# Patient Record
Sex: MALE | Race: WHITE | NOT HISPANIC OR LATINO
[De-identification: names, ages, dates, MRNs, and addresses within clinical notes are randomized per-mention and may not be internally consistent; named-entity substitution may affect disease eponyms.]

---

## 2019-01-23 ENCOUNTER — TRANSCRIPTION ENCOUNTER (OUTPATIENT)
Age: 74
End: 2019-01-23

## 2020-08-11 ENCOUNTER — NON-APPOINTMENT (OUTPATIENT)
Age: 75
End: 2020-08-11

## 2020-08-11 ENCOUNTER — APPOINTMENT (OUTPATIENT)
Dept: OPHTHALMOLOGY | Facility: CLINIC | Age: 75
End: 2020-08-11
Payer: MEDICARE

## 2020-08-11 PROCEDURE — 92014 COMPRE OPH EXAM EST PT 1/>: CPT

## 2020-08-27 PROBLEM — Z00.00 ENCOUNTER FOR PREVENTIVE HEALTH EXAMINATION: Status: ACTIVE | Noted: 2020-08-27

## 2020-09-06 ENCOUNTER — APPOINTMENT (OUTPATIENT)
Dept: MRI IMAGING | Facility: CLINIC | Age: 75
End: 2020-09-06
Payer: MEDICARE

## 2020-09-06 ENCOUNTER — OUTPATIENT (OUTPATIENT)
Dept: OUTPATIENT SERVICES | Facility: HOSPITAL | Age: 75
LOS: 1 days | End: 2020-09-06
Payer: MEDICARE

## 2020-09-06 DIAGNOSIS — Z00.8 ENCOUNTER FOR OTHER GENERAL EXAMINATION: ICD-10-CM

## 2020-09-06 PROCEDURE — 73221 MRI JOINT UPR EXTREM W/O DYE: CPT | Mod: 26,RT

## 2020-09-06 PROCEDURE — 73221 MRI JOINT UPR EXTREM W/O DYE: CPT

## 2020-11-11 ENCOUNTER — APPOINTMENT (OUTPATIENT)
Dept: OPHTHALMOLOGY | Facility: CLINIC | Age: 75
End: 2020-11-11

## 2021-03-08 ENCOUNTER — INPATIENT (INPATIENT)
Facility: HOSPITAL | Age: 76
LOS: 1 days | Discharge: HOME | DRG: 247 | End: 2021-03-10
Attending: INTERNAL MEDICINE | Admitting: INTERNAL MEDICINE
Payer: MEDICARE

## 2021-03-08 VITALS
OXYGEN SATURATION: 95 % | HEIGHT: 60 IN | HEART RATE: 67 BPM | DIASTOLIC BLOOD PRESSURE: 84 MMHG | RESPIRATION RATE: 18 BRPM | SYSTOLIC BLOOD PRESSURE: 167 MMHG | WEIGHT: 160.06 LBS | TEMPERATURE: 98 F

## 2021-03-08 DIAGNOSIS — R07.9 CHEST PAIN, UNSPECIFIED: ICD-10-CM

## 2021-03-08 LAB
ALBUMIN SERPL ELPH-MCNC: 4.5 G/DL — SIGNIFICANT CHANGE UP (ref 3.3–5)
ALP SERPL-CCNC: 53 U/L — SIGNIFICANT CHANGE UP (ref 40–120)
ALT FLD-CCNC: 22 U/L — SIGNIFICANT CHANGE UP (ref 10–45)
ANION GAP SERPL CALC-SCNC: 12 MMOL/L — SIGNIFICANT CHANGE UP (ref 5–17)
APTT BLD: 34.8 SEC — SIGNIFICANT CHANGE UP (ref 27.5–35.5)
AST SERPL-CCNC: 38 U/L — SIGNIFICANT CHANGE UP (ref 10–40)
BASOPHILS # BLD AUTO: 0.09 K/UL — SIGNIFICANT CHANGE UP (ref 0–0.2)
BASOPHILS NFR BLD AUTO: 1 % — SIGNIFICANT CHANGE UP (ref 0–2)
BILIRUB SERPL-MCNC: 0.5 MG/DL — SIGNIFICANT CHANGE UP (ref 0.2–1.2)
BLD GP AB SCN SERPL QL: NEGATIVE — SIGNIFICANT CHANGE UP
BUN SERPL-MCNC: 19 MG/DL — SIGNIFICANT CHANGE UP (ref 7–23)
CALCIUM SERPL-MCNC: 10.7 MG/DL — HIGH (ref 8.4–10.5)
CHLORIDE SERPL-SCNC: 102 MMOL/L — SIGNIFICANT CHANGE UP (ref 96–108)
CK MB BLD-MCNC: 5.7 % — HIGH (ref 0–3.5)
CK MB CFR SERPL CALC: 11.1 NG/ML — HIGH (ref 0–6.7)
CK SERPL-CCNC: 196 U/L — SIGNIFICANT CHANGE UP (ref 30–200)
CO2 SERPL-SCNC: 26 MMOL/L — SIGNIFICANT CHANGE UP (ref 22–31)
CREAT SERPL-MCNC: 1.05 MG/DL — SIGNIFICANT CHANGE UP (ref 0.5–1.3)
EOSINOPHIL # BLD AUTO: 0.44 K/UL — SIGNIFICANT CHANGE UP (ref 0–0.5)
EOSINOPHIL NFR BLD AUTO: 4.9 % — SIGNIFICANT CHANGE UP (ref 0–6)
GLUCOSE SERPL-MCNC: 87 MG/DL — SIGNIFICANT CHANGE UP (ref 70–99)
HCT VFR BLD CALC: 48.9 % — SIGNIFICANT CHANGE UP (ref 39–50)
HGB BLD-MCNC: 16.5 G/DL — SIGNIFICANT CHANGE UP (ref 13–17)
IMM GRANULOCYTES NFR BLD AUTO: 0.9 % — SIGNIFICANT CHANGE UP (ref 0–1.5)
INR BLD: 0.95 RATIO — SIGNIFICANT CHANGE UP (ref 0.88–1.16)
LYMPHOCYTES # BLD AUTO: 2.58 K/UL — SIGNIFICANT CHANGE UP (ref 1–3.3)
LYMPHOCYTES # BLD AUTO: 28.4 % — SIGNIFICANT CHANGE UP (ref 13–44)
MCHC RBC-ENTMCNC: 31.2 PG — SIGNIFICANT CHANGE UP (ref 27–34)
MCHC RBC-ENTMCNC: 33.7 GM/DL — SIGNIFICANT CHANGE UP (ref 32–36)
MCV RBC AUTO: 92.4 FL — SIGNIFICANT CHANGE UP (ref 80–100)
MONOCYTES # BLD AUTO: 0.68 K/UL — SIGNIFICANT CHANGE UP (ref 0–0.9)
MONOCYTES NFR BLD AUTO: 7.5 % — SIGNIFICANT CHANGE UP (ref 2–14)
NEUTROPHILS # BLD AUTO: 5.2 K/UL — SIGNIFICANT CHANGE UP (ref 1.8–7.4)
NEUTROPHILS NFR BLD AUTO: 57.3 % — SIGNIFICANT CHANGE UP (ref 43–77)
NRBC # BLD: 0 /100 WBCS — SIGNIFICANT CHANGE UP (ref 0–0)
PLATELET # BLD AUTO: 170 K/UL — SIGNIFICANT CHANGE UP (ref 150–400)
POTASSIUM SERPL-MCNC: 5 MMOL/L — SIGNIFICANT CHANGE UP (ref 3.5–5.3)
POTASSIUM SERPL-SCNC: 5 MMOL/L — SIGNIFICANT CHANGE UP (ref 3.5–5.3)
PROT SERPL-MCNC: 7.5 G/DL — SIGNIFICANT CHANGE UP (ref 6–8.3)
PROTHROM AB SERPL-ACNC: 11.4 SEC — SIGNIFICANT CHANGE UP (ref 10.6–13.6)
RBC # BLD: 5.29 M/UL — SIGNIFICANT CHANGE UP (ref 4.2–5.8)
RBC # FLD: 13.5 % — SIGNIFICANT CHANGE UP (ref 10.3–14.5)
RH IG SCN BLD-IMP: POSITIVE — SIGNIFICANT CHANGE UP
SODIUM SERPL-SCNC: 140 MMOL/L — SIGNIFICANT CHANGE UP (ref 135–145)
TROPONIN T, HIGH SENSITIVITY RESULT: 75 NG/L — HIGH (ref 0–51)
TROPONIN T, HIGH SENSITIVITY RESULT: 75 NG/L — HIGH (ref 0–51)
WBC # BLD: 9.07 K/UL — SIGNIFICANT CHANGE UP (ref 3.8–10.5)
WBC # FLD AUTO: 9.07 K/UL — SIGNIFICANT CHANGE UP (ref 3.8–10.5)

## 2021-03-08 PROCEDURE — 71046 X-RAY EXAM CHEST 2 VIEWS: CPT | Mod: 26

## 2021-03-08 PROCEDURE — 93010 ELECTROCARDIOGRAM REPORT: CPT | Mod: GC

## 2021-03-08 PROCEDURE — 99285 EMERGENCY DEPT VISIT HI MDM: CPT | Mod: CS,GC

## 2021-03-08 RX ORDER — SIMVASTATIN 20 MG/1
40 TABLET, FILM COATED ORAL AT BEDTIME
Refills: 0 | Status: DISCONTINUED | OUTPATIENT
Start: 2021-03-08 | End: 2021-03-10

## 2021-03-08 RX ORDER — ASPIRIN/CALCIUM CARB/MAGNESIUM 324 MG
81 TABLET ORAL DAILY
Refills: 0 | Status: DISCONTINUED | OUTPATIENT
Start: 2021-03-08 | End: 2021-03-10

## 2021-03-08 RX ORDER — METOPROLOL TARTRATE 50 MG
1 TABLET ORAL
Qty: 0 | Refills: 0 | DISCHARGE

## 2021-03-08 RX ORDER — CLOPIDOGREL BISULFATE 75 MG/1
75 TABLET, FILM COATED ORAL DAILY
Refills: 0 | Status: DISCONTINUED | OUTPATIENT
Start: 2021-03-08 | End: 2021-03-10

## 2021-03-08 RX ORDER — METOPROLOL TARTRATE 50 MG
25 TABLET ORAL
Refills: 0 | Status: DISCONTINUED | OUTPATIENT
Start: 2021-03-08 | End: 2021-03-10

## 2021-03-08 RX ORDER — HEPARIN SODIUM 5000 [USP'U]/ML
800 INJECTION INTRAVENOUS; SUBCUTANEOUS
Qty: 25000 | Refills: 0 | Status: DISCONTINUED | OUTPATIENT
Start: 2021-03-08 | End: 2021-03-09

## 2021-03-08 RX ORDER — SIMVASTATIN 20 MG/1
1 TABLET, FILM COATED ORAL
Qty: 0 | Refills: 0 | DISCHARGE

## 2021-03-08 RX ORDER — AMLODIPINE BESYLATE 2.5 MG/1
5 TABLET ORAL DAILY
Refills: 0 | Status: DISCONTINUED | OUTPATIENT
Start: 2021-03-08 | End: 2021-03-10

## 2021-03-08 RX ORDER — INFLUENZA VIRUS VACCINE 15; 15; 15; 15 UG/.5ML; UG/.5ML; UG/.5ML; UG/.5ML
0.5 SUSPENSION INTRAMUSCULAR ONCE
Refills: 0 | Status: DISCONTINUED | OUTPATIENT
Start: 2021-03-08 | End: 2021-03-10

## 2021-03-08 RX ORDER — ASPIRIN/CALCIUM CARB/MAGNESIUM 324 MG
1 TABLET ORAL
Qty: 0 | Refills: 0 | DISCHARGE

## 2021-03-08 RX ORDER — EZETIMIBE 10 MG/1
1 TABLET ORAL
Qty: 0 | Refills: 0 | DISCHARGE

## 2021-03-08 RX ORDER — HEPARIN SODIUM 5000 [USP'U]/ML
4400 INJECTION INTRAVENOUS; SUBCUTANEOUS EVERY 6 HOURS
Refills: 0 | Status: DISCONTINUED | OUTPATIENT
Start: 2021-03-08 | End: 2021-03-09

## 2021-03-08 RX ORDER — BUDESONIDE AND FORMOTEROL FUMARATE DIHYDRATE 160; 4.5 UG/1; UG/1
2 AEROSOL RESPIRATORY (INHALATION)
Refills: 0 | Status: DISCONTINUED | OUTPATIENT
Start: 2021-03-08 | End: 2021-03-10

## 2021-03-08 RX ORDER — BUDESONIDE AND FORMOTEROL FUMARATE DIHYDRATE 160; 4.5 UG/1; UG/1
2 AEROSOL RESPIRATORY (INHALATION)
Qty: 0 | Refills: 0 | DISCHARGE

## 2021-03-08 RX ORDER — AMLODIPINE AND VALSARTAN 5; 320 MG/1; MG/1
1 TABLET, FILM COATED ORAL
Qty: 0 | Refills: 0 | DISCHARGE

## 2021-03-08 RX ORDER — HEPARIN SODIUM 5000 [USP'U]/ML
5000 INJECTION INTRAVENOUS; SUBCUTANEOUS EVERY 12 HOURS
Refills: 0 | Status: DISCONTINUED | OUTPATIENT
Start: 2021-03-08 | End: 2021-03-08

## 2021-03-08 RX ORDER — VALSARTAN 80 MG/1
160 TABLET ORAL DAILY
Refills: 0 | Status: DISCONTINUED | OUTPATIENT
Start: 2021-03-08 | End: 2021-03-10

## 2021-03-08 RX ADMIN — SIMVASTATIN 40 MILLIGRAM(S): 20 TABLET, FILM COATED ORAL at 21:56

## 2021-03-08 RX ADMIN — BUDESONIDE AND FORMOTEROL FUMARATE DIHYDRATE 2 PUFF(S): 160; 4.5 AEROSOL RESPIRATORY (INHALATION) at 21:55

## 2021-03-08 RX ADMIN — HEPARIN SODIUM 800 UNIT(S)/HR: 5000 INJECTION INTRAVENOUS; SUBCUTANEOUS at 23:10

## 2021-03-08 RX ADMIN — HEPARIN SODIUM 5000 UNIT(S): 5000 INJECTION INTRAVENOUS; SUBCUTANEOUS at 21:55

## 2021-03-08 NOTE — H&P ADULT - ASSESSMENT
76 yo male with history of hypertension and hypercholesterol presents after being referred by pmd for unstable angina. He has had chest pain with no radiation for the past 3 weeks. He has had some shortness of breath and palpitations with activity. No swelling and has been taking his medications. No fevers, cough, belly pain or swelling noted.  pt with sig cardiac risk factor with c/o increasing sob .pulmonary htn, with new onset of exertional sob and gamboa with ecg changes.  tele  asa  plavix  beta blocker  lipid, continue zocor  tsh  continue all bp meds  R/L heart cath

## 2021-03-08 NOTE — ED PROVIDER NOTE - OBJECTIVE STATEMENT
74 yo male with history of hypertension and hypercholesterol presents after being referred by Dr. Fuentes for unstable angina. He has had chest pain with no radiation for the past 3 weeks. He has had some shortness of breath and palpitations with activity. No swelling and has been taking his medications. No fevers, cough, belly pain or swelling noted. 74 yo male with history of hypertension and hypercholesterol presents after being referred by Dr. Fuentes for unstable angina. He has had chest pain with no radiation for the past 3 weeks. He has had some shortness of breath and palpitations with activity. No swelling and has been taking his medications. No fevers, cough, belly pain or swelling noted.    He has been taking aspirin, amlodipine, metoprolol, simvastatin, and symbicort. 76 yo male with history of hypertension and hypercholesterol presents after being referred by Dr. Fuentes for unstable angina. He has had chest pain with no radiation for the past 3 weeks. He has had some shortness of breath and palpitations with activity. No swelling and has been taking his medications. No fevers, cough, belly pain or swelling noted. Does reports Abnormal Stress test 8/2020, which he did not follow-up on  He has been taking aspirin, amlodipine, metoprolol, simvastatin, and symbicort.

## 2021-03-08 NOTE — ED PROVIDER NOTE - PRINCIPAL DIAGNOSIS
- FSBS with low dose lispro coverage  - HBA1c 6.6 can restart ASA post biopsy Chest pain, unspecified type

## 2021-03-08 NOTE — ED PROVIDER NOTE - ST/T WAVE
no acute ST elevations/depressions no acute ST elevations/depressions, Twave inversions in Lateral leads

## 2021-03-08 NOTE — ED ADULT NURSE REASSESSMENT NOTE - NS ED NURSE REASSESS COMMENT FT1
Patient resting in bed comfortably in no acute distress, pending further orders, updated on plan of care. Billy RN

## 2021-03-08 NOTE — H&P ADULT - HISTORY OF PRESENT ILLNESS
CHIEF COMPLAINT:Patient is a 75y old  Male who presents with a chief complaint of sob    HPI:  76 yo male with history of hypertension and hypercholesterol presents after being referred by pmd for unstable angina. He has had chest pain with no radiation for the past 3 weeks. He has had some shortness of breath and palpitations with activity. No swelling and has been taking his medications. No fevers, cough, belly pain or swelling noted.  pt with sig cradiac risk factor with c/o increasing sob .pulmonary htn, with new onst of exertional sob and gamboa with ecg changes.    PAST MEDICAL & SURGICAL HISTORY:  Hypercholesteremia    HTN (Hypertension)    History of Pneumonectomy        MEDICATIONS  (STANDING):  noted    MEDICATIONS  (PRN):      FAMILY HISTORY:      SOCIAL HISTORY:    [ ] Non-smoker  [ ] Smoker  [ ] Alcohol    Allergies    No Known Allergies    Intolerances    	    REVIEW OF SYSTEMS:  CONSTITUTIONAL: No fever, weight loss, or fatigue  EYES: No eye pain, visual disturbances, or discharge  ENT:  No difficulty hearing, tinnitus, vertigo; No sinus or throat pain  NECK: No pain or stiffness  RESPIRATORY: No cough, wheezing, chills or hemoptysis; + exertional  Shortness of Breath  CARDIOVASCULAR: No chest pain, palpitations, passing out, dizziness, or leg swelling  GASTROINTESTINAL: No abdominal or epigastric pain. No nausea, vomiting, or hematemesis; No diarrhea or constipation. No melena or hematochezia.  GENITOURINARY: No dysuria, frequency, hematuria, or incontinence  NEUROLOGICAL: No headaches, memory loss, loss of strength, numbness, or tremors  SKIN: No itching, burning, rashes, or lesions   LYMPH Nodes: No enlarged glands  ENDOCRINE: No heat or cold intolerance; No hair loss  MUSCULOSKELETAL: No joint pain or swelling; No muscle, back, or extremity pain  PSYCHIATRIC: No depression, anxiety, mood swings, or difficulty sleeping  HEME/LYMPH: No easy bruising, or bleeding gums  ALLERGY AND IMMUNOLOGIC: No hives or eczema	    [ ] All others negative	  [ ] Unable to obtain    PHYSICAL EXAM:  T(C): 36.7 (03-08-21 @ 15:43), Max: 36.7 (03-08-21 @ 15:43)  HR: 67 (03-08-21 @ 15:43) (67 - 67)  BP: 167/84 (03-08-21 @ 15:43) (167/84 - 167/84)  RR: 18 (03-08-21 @ 15:43) (18 - 18)  SpO2: 95% (03-08-21 @ 15:43) (95% - 95%)  Wt(kg): --  I&O's Summary      Appearance: Normal	  HEENT:   Normal oral mucosa, PERRL, EOMI	  Lymphatic: No lymphadenopathy  Cardiovascular: Normal S1 S2, No JVD, + murmurs, No edema  Respiratory: rhonchi  Psychiatry: A & O x 3, Mood & affect appropriate  Gastrointestinal:  Soft, Non-tender, + BS	  Skin: No rashes, No ecchymoses, No cyanosis	  Neurologic: Non-focal  Extremities: Normal range of motion, No clubbing, cyanosis or edema  Vascular: Peripheral pulses palpable 2+ bilaterally    TELEMETRY: 	    ECG:  	  RADIOLOGY:  OTHER: 	  	  LABS:	 	    CARDIAC MARKERS:                              16.5   9.07  )-----------( 170      ( 08 Mar 2021 17:05 )             48.9     03-08    140  |  102  |  19  ----------------------------<  87  5.0   |  26  |  1.05    Ca    10.7<H>      08 Mar 2021 17:05    TPro  7.5  /  Alb  4.5  /  TBili  0.5  /  DBili  x   /  AST  38  /  ALT  22  /  AlkPhos  53  03-08    proBNP:   Lipid Profile:   HgA1c:   TSH:   PT/INR - ( 08 Mar 2021 17:05 )   PT: 11.4 sec;   INR: 0.95 ratio         PTT - ( 08 Mar 2021 17:05 )  PTT:34.8 sec    PREVIOUS DIAGNOSTIC TESTING:      < from: 12 Lead ECG (03.11.11 @ 08:25) >  Diagnosis Line NORMAL SINUS RHYTHM  RIGHT BUNDLE BRANCH BLOCK  LEFT VENTRICULAR HYPERTROPHY WITH QRS WIDENING  ST ELEVATION CONSIDER LATERAL INJURY OR ACUTE INFARCT

## 2021-03-08 NOTE — PATIENT PROFILE ADULT - FALL HARM RISK
"Sindi Warner (66 y.o. Male)     Date of Birth Social Security Number Address Home Phone MRN    1954  [id number]  [place] [address]  [id number]    Cheondoism Marital Status          Judaism        Admission Date Admission Type Admitting Provider Attending Provider Department, Room/Bed    5/2/20 Emergency Yahir Ferris MD Pagni, Sebastian, MD [facility] CARDIOVASCULAR CARE UNIT, 2203/1    Discharge Date Discharge Disposition Discharge Destination                       Attending Provider:  Yahir Ferris MD    Allergies:  Neosporin [Bacitracin-polymyxin B]    Isolation:  None   Infection:  None   Code Status:  CPR    Ht:  188 cm (74.02\")   Wt:  96.9 kg (213 lb 10 oz)    Admission Cmt:  None   Principal Problem:  Coronary artery disease of native artery of native heart with stable angina pectoris (CMS/[facility]) [I25.118]                 Active Insurance as of 5/2/2020     Primary Coverage     Payor Plan Insurance Group Employer/Plan Group    MEDICARE MEDICARE A ONLY      Payor Plan Address Payor Plan Phone Number Payor Plan Fax Number Effective Dates    [address] [phone]  4/1/2017 - None Entered    [facility] [id number]       Subscriber Name Subscriber Birth Date Member ID       SINDI WARNER 1954 [id number]           Secondary Coverage     Payor Plan Insurance Group Employer/Plan Group    HUMANA HUMANA [id number]     Payor Plan Address Payor Plan Phone Number Payor Plan Fax Number Effective Dates    [address] [address] [phone]  1/2/2017 - None Entered    [facility] [id number]       Subscriber Name Subscriber Birth Date Member ID       KORTNEY WARNER 1954 [id number]                 Emergency Contacts      (Rel.) Home Phone Work Phone Mobile Phone    KORTNEY WARNER (Spouse) -- -- [phone]              " n/a

## 2021-03-08 NOTE — CHART NOTE - NSCHARTNOTEFT_GEN_A_CORE
CC:    HPI: Called by RN to evaluate patient for . Patient seen and examined at the bedside. Denies fevers/chills, weakness, diaphoresis, headaches, dizziness, syncope, paresthesias, chest pain, palpitations, dyspnea, abdominal pain, N/V/D.     Vital Signs Last 24 Hrs  T(C): 36.5 (08 Mar 2021 21:15), Max: 36.7 (08 Mar 2021 15:43)  T(F): 97.7 (08 Mar 2021 21:15), Max: 98 (08 Mar 2021 15:43)  HR: 55 (08 Mar 2021 21:15) (55 - 67)  BP: 106/66 (08 Mar 2021 21:15) (106/66 - 167/84)  BP(mean): --  RR: 18 (08 Mar 2021 21:15) (16 - 18)  SpO2: 97% (08 Mar 2021 21:15) (95% - 99%)    PHYSICAL EXAM:  General: NAD, A&Ox3  Respiratory: Lungs clear to auscultation bilaterally. No wheezes, rales, rhonchi. Normal respiratory effort.   Cardiovascular: S1, S2 present. Regular rate and rhythm. No murmurs, rubs, or gallops  Gastrointestinal: BS x4 normoactive. Soft, non-tender, non-distended.   Extremities: 2+ peripheral pulses. Warm to touch. No edema, cyanosis.    A/P  HPI:  CHIEF COMPLAINT:Patient is a 75y old  Male who presents with a chief complaint of sob    HPI:  76 yo male with history of hypertension and hypercholesterol presents after being referred by pmd for unstable angina. He has had chest pain with no radiation for the past 3 weeks. He has had some shortness of breath and palpitations with activity. No swelling and has been taking his medications. No fevers, cough, belly pain or swelling noted.  pt with sig cradiac risk factor with c/o increasing sob .pulmonary htn, with new onst of exertional sob and gamboa with ecg changes.    PAST MEDICAL & SURGICAL HISTORY:  Hypercholesteremia    HTN (Hypertension)    History of Pneumonectomy        MEDICATIONS  (STANDING):  noted    MEDICATIONS  (PRN):      FAMILY HISTORY:      SOCIAL HISTORY:    [ ] Non-smoker  [ ] Smoker  [ ] Alcohol    Allergies    No Known Allergies    Intolerances    	    REVIEW OF SYSTEMS:  CONSTITUTIONAL: No fever, weight loss, or fatigue  EYES: No eye pain, visual disturbances, or discharge  ENT:  No difficulty hearing, tinnitus, vertigo; No sinus or throat pain  NECK: No pain or stiffness  RESPIRATORY: No cough, wheezing, chills or hemoptysis; + exertional  Shortness of Breath  CARDIOVASCULAR: No chest pain, palpitations, passing out, dizziness, or leg swelling  GASTROINTESTINAL: No abdominal or epigastric pain. No nausea, vomiting, or hematemesis; No diarrhea or constipation. No melena or hematochezia.  GENITOURINARY: No dysuria, frequency, hematuria, or incontinence  NEUROLOGICAL: No headaches, memory loss, loss of strength, numbness, or tremors  SKIN: No itching, burning, rashes, or lesions   LYMPH Nodes: No enlarged glands  ENDOCRINE: No heat or cold intolerance; No hair loss  MUSCULOSKELETAL: No joint pain or swelling; No muscle, back, or extremity pain  PSYCHIATRIC: No depression, anxiety, mood swings, or difficulty sleeping  HEME/LYMPH: No easy bruising, or bleeding gums  ALLERGY AND IMMUNOLOGIC: No hives or eczema	    [ ] All others negative	  [ ] Unable to obtain    PHYSICAL EXAM:  T(C): 36.7 (03-08-21 @ 15:43), Max: 36.7 (03-08-21 @ 15:43)  HR: 67 (03-08-21 @ 15:43) (67 - 67)  BP: 167/84 (03-08-21 @ 15:43) (167/84 - 167/84)  RR: 18 (03-08-21 @ 15:43) (18 - 18)  SpO2: 95% (03-08-21 @ 15:43) (95% - 95%)  Wt(kg): --  I&O's Summary      Appearance: Normal	  HEENT:   Normal oral mucosa, PERRL, EOMI	  Lymphatic: No lymphadenopathy  Cardiovascular: Normal S1 S2, No JVD, + murmurs, No edema  Respiratory: rhonchi  Psychiatry: A & O x 3, Mood & affect appropriate  Gastrointestinal:  Soft, Non-tender, + BS	  Skin: No rashes, No ecchymoses, No cyanosis	  Neurologic: Non-focal  Extremities: Normal range of motion, No clubbing, cyanosis or edema  Vascular: Peripheral pulses palpable 2+ bilaterally    TELEMETRY: 	    ECG:  	  RADIOLOGY:  OTHER: 	  	  LABS:	 	    CARDIAC MARKERS:                              16.5   9.07  )-----------( 170      ( 08 Mar 2021 17:05 )             48.9     03-08    140  |  102  |  19  ----------------------------<  87  5.0   |  26  |  1.05    Ca    10.7<H>      08 Mar 2021 17:05    TPro  7.5  /  Alb  4.5  /  TBili  0.5  /  DBili  x   /  AST  38  /  ALT  22  /  AlkPhos  53  03-08    proBNP:   Lipid Profile:   HgA1c:   TSH:   PT/INR - ( 08 Mar 2021 17:05 )   PT: 11.4 sec;   INR: 0.95 ratio         PTT - ( 08 Mar 2021 17:05 )  PTT:34.8 sec    PREVIOUS DIAGNOSTIC TESTING:      < from: 12 Lead ECG (03.11.11 @ 08:25) >  Diagnosis Line NORMAL SINUS RHYTHM  RIGHT BUNDLE BRANCH BLOCK  LEFT VENTRICULAR HYPERTROPHY WITH QRS WIDENING  ST ELEVATION CONSIDER LATERAL INJURY OR ACUTE INFARCT           (08 Mar 2021 17:36)      #  -Repeat vitals were stable  -Discussed with RN  -Will continue to monitor  -Will endorse to AM team      Em Baker PA-C  # CC: Chest pain    HPI: Called by RN to evaluate patient for chest pain. Patient seen and examined at the bedside. Patient states that chest pain had subsided prior to being seen by provider, and denies any current chest pain, palpitations, or dyspnea. Patient reports chest pain is intermittent and non-radiating, and has been "ok" in rating. Denies fevers/chills, weakness, diaphoresis, headaches, dizziness, syncope, paresthesias, abdominal pain, N/V/D.     Vital Signs Last 24 Hrs  T(C): 36.5 (08 Mar 2021 21:15), Max: 36.7 (08 Mar 2021 15:43)  T(F): 97.7 (08 Mar 2021 21:15), Max: 98 (08 Mar 2021 15:43)  HR: 55 (08 Mar 2021 21:15) (55 - 67)  BP: 106/66 (08 Mar 2021 21:15) (106/66 - 167/84)  RR: 18 (08 Mar 2021 21:15) (16 - 18)  SpO2: 97% (08 Mar 2021 21:15) (95% - 99%)    PHYSICAL EXAM:  General: NAD, A&Ox3  Respiratory: Lungs clear to auscultation bilaterally. No wheezes, rales, rhonchi. Normal respiratory effort.   Cardiovascular: S1, S2 present. Regular rate and rhythm. No murmurs, rubs, or gallops  Gastrointestinal: BS x4 normoactive. Soft, non-tender, non-distended.   Extremities: 2+ peripheral pulses. Warm to touch. No edema, cyanosis.    LABS:  Troponin T, High Sensitivity (03.08.21 @ 17:05)    Troponin T, High Sensitivity Result: 75: Moderate Hemolysis,  Troponin T results may be falsely decreased  *  *  Rapid upward or downward changes in high-sensitivity troponin levels  suggest acute myocardial injury. Renal impairment may cause sustained  troponin elevations.  Normal: <6 - 14ng/L  Indeterminate: 15-51 ng/L  Elevated: > 51 ng/L  See http://labs/test/[email or web address] on the [facility] intranet for more  information ng/L    Troponin T, High Sensitivity (03.08.21 @ 21:19)    Troponin T, High Sensitivity Result: 75: Moderate Hemolysis,Troponin T results may be falsely decreased  *  *  Rapid upward or downward changes in high-sensitivity troponin levels  suggest acute myocardial injury. Renal impairment may cause sustained  troponin elevations.  Normal: <6 - 14 ng/L  Indeterminate: 15-51 ng/L  Elevated: > 51 ng/L  See [email or web address]/test/[email or web address] on the [facility] intranet for more  information ng/L    CARDIAC MARKERS ( 08 Mar 2021 21:19 )  x     / x     / 196 U/L / x     / 11.1 ng/mL    RADIOLOGY:  < from: Xray Chest 2 Views PA/Lat (03.08.21 @ 17:57) >    No focal consolidation. Small left pleural effusion cannot be ruled out entirely.    < end of copied text >      A/P  76 yo male with history of hypertension and hypercholesterol presents after being referred by Dr. Fuentes for unstable angina. He has had chest pain with no radiation for the past 3 weeks, and some shortness of breath and palpitations with activity.   Now, patient with chest pain that subsided prior to being seen, and denies any current chest pain, palpitations, or dyspnea. Patient reports chest pain is intermittent and non-radiating, and has been "ok" in rating.    #Chest pain, likely ACS  -Repeat vitals were stable  -Elevated trops have plateaued (75 -> 75)  -STAT EKG reveals sinus reshma (HR: 55) with new TWI in II-III and V3-V6  -R/L cath in AM  -Discussed with Dr. Fuentes: Start hep gtt @ 8cc/hr (800 units/hr)  -Discussed with RN  -Will continue to monitor  -Will endorse to AM team      Em Baker PA-C  #[id number]

## 2021-03-08 NOTE — ED PROVIDER NOTE - CLINICAL SUMMARY MEDICAL DECISION MAKING FREE TEXT BOX
Hermila Montemayor MD - Attending Physician: Pt here with chest pain, persistent x months. Sent in by Dr Fuentes. No red flags on arrival to ED. HDS. Labs, Xr, D/w Dr Fuentes for admission - likely needs Cath if persistent symptoms

## 2021-03-08 NOTE — ED ADULT NURSE NOTE - OBJECTIVE STATEMENT
76yo male PMHX HTN, HLD, presents ambulatory into er alert and oriented x 4 with complaints of chest pain x approximately 3 weeks, states sent by cardiology for angiogram, states hx angina in past with normal angiocardiogram, states pain is tolerable but presents due to recommendation by cardiology. Respirations even and nonlabored, breathing spontaneously on room air. Placed on cardiac monitor. Labs collected. Pending md evaluation. Will continue to monitor. VILMA Cervantes 76yo male PMHX HTN, HLD, presents ambulatory into er alert and oriented x 4 with complaints of left chest pain x approximately 3 weeks, states sent by cardiology for angiogram, states hx angina in past with normal angiocardiogram, states pain is tolerable but presents due to recommendation by cardiology. Respirations even and nonlabored, breathing spontaneously on room air. Denies dizziness, shortness of breath, blurry vision. Placed on cardiac monitor. Labs collected. Pending md evaluation. Will continue to monitor. VILMA Cervantes

## 2021-03-08 NOTE — ED PROVIDER NOTE - PROGRESS NOTE DETAILS
Discussed case with Dr. Jef Fuentes (#[phone]) who would like the patient admitted to him. Mary Newton MD

## 2021-03-08 NOTE — ED CLERICAL - NS ED CLERK UNITS
APER Advance Directive  Advance directives are legal documents that let you make choices ahead of time about your health care and medical treatment in case you become unable to communicate for yourself. Advance directives are a way for you to communicate your wishes to family, friends, and health care providers. This can help convey your decisions about end-of-life care if you become unable to communicate.  Discussing and writing advance directives should happen over time rather than all at once. Advance directives can be changed depending on your situation and what you want, even after you have signed the advance directives.  If you do not have an advance directive, some states assign family decision makers to act on your behalf based on how closely you are related to them. Each state has its own laws regarding advance directives. You may want to check with your health care provider, , or state representative about the laws in your state. There are different types of advance directives, such as:  · Medical power of .  · Living will.  · Do not resuscitate (DNR) or do not attempt resuscitation (DNAR) order.    Health care proxy and medical power of   A health care proxy, also called a health care agent, is a person who is appointed to make medical decisions for you in cases in which you are unable to make the decisions yourself. Generally, people choose someone they know well and trust to represent their preferences. Make sure to ask this person for an agreement to act as your proxy. A proxy may have to exercise judgment in the event of a medical decision for which your wishes are not known.  A medical power of  is a legal document that names your health care proxy. Depending on the laws in your state, after the document is written, it may also need to be:  · Signed.  · Notarized.  · Dated.  · Copied.  · Witnessed.  · Incorporated into your medical record.    You may also want to appoint  someone to manage your financial affairs in a situation in which you are unable to do so. This is called a durable power of  for finances. It is a separate legal document from the durable power of  for health care. You may choose the same person or someone different from your health care proxy to act as your agent in financial matters.  If you do not appoint a proxy, or if there is a concern that the proxy is not acting in your best interests, a court-appointed guardian may be designated to act on your behalf.  Living will  A living will is a set of instructions documenting your wishes about medical care when you cannot express them yourself. Health care providers should keep a copy of your living will in your medical record. You may want to give a copy to family members or friends. To alert caregivers in case of an emergency, you can place a card in your wallet to let them know that you have a living will and where they can find it. A living will is used if you become:  · Terminally ill.  · Incapacitated.  · Unable to communicate or make decisions.    Items to consider in your living will include:  · The use or non-use of life-sustaining equipment, such as dialysis machines and breathing machines (ventilators).  · A DNR or DNAR order, which is the instruction not to use cardiopulmonary resuscitation (CPR) if breathing or heartbeat stops.  · The use or non-use of tube feeding.  · Withholding of food and fluids.  · Comfort (palliative) care when the goal becomes comfort rather than a cure.  · Organ and tissue donation.    A living will does not give instructions for distributing your money and property if you should pass away. It is recommended that you seek the advice of a  when writing a will. Decisions about taxes, beneficiaries, and asset distribution will be legally binding. This process can relieve your family and friends of any concerns surrounding disputes or questions that may come up  about the distribution of your assets.  DNR or DNAR  A DNR or DNAR order is a request not to have CPR in the event that your heart stops beating or you stop breathing. If a DNR or DNAR order has not been made and shared, a health care provider will try to help any patient whose heart has stopped or who has stopped breathing. If you plan to have surgery, talk with your health care provider about how your DNR or DNAR order will be followed if problems occur.  Summary  · Advance directives are the legal documents that allow you to make choices ahead of time about your health care and medical treatment in case you become unable to communicate for yourself.  · The process of discussing and writing advance directives should happen over time. You can change the advance directives, even after you have signed them.  · Advance directives include DNR or DNAR orders, living dawn, and designating an agent as your medical power of .  This information is not intended to replace advice given to you by your health care provider. Make sure you discuss any questions you have with your health care provider.  Document Released: 2009 Document Revised: 2017 Document Reviewed: 2017  [employer] Interactive Patient Education © 2019 [employer]    Medicare Wellness  Personal Prevention Plan of Service     Date of Office Visit:  2019  Encounter Provider:  Sintia Pearson PA-C  Place of Service:  [facility]  Patient Name: Andrea Rosales Jr.  :  1946    As part of the Medicare Wellness portion of your visit today, we are providing you with this personalized preventive plan of services (PPPS). This plan is based upon recommendations of the [place] Preventive Services Task Force (USPSTF) and the Advisory Committee on Immunization Practices (ACIP).    This lists the preventive care services that should be considered, and provides dates of when you are due.  Items listed as completed are up-to-date and do not require any further intervention.    Health Maintenance   Topic Date Due   • TDAP/TD VACCINES (1 - Tdap) 10/28/1963   • ZOSTER VACCINE (1 of 2) 10/28/1994   • PNEUMOCOCCAL VACCINES (65+ LOW/MEDIUM RISK) (1 of 2 - PCV13) 10/28/2009   • MEDICARE ANNUAL WELLNESS  06/25/2019   • INFLUENZA VACCINE  08/01/2019   • COLONOSCOPY  08/25/2019   • LIPID PANEL  01/10/2020       No orders of the defined types were placed in this encounter.      No Follow-up on file.

## 2021-03-08 NOTE — ED ADULT NURSE NOTE - NSIMPLEMENTINTERV_GEN_ALL_ED
Implemented All Universal Safety Interventions:  [place] to call system. Call bell, personal items and telephone within reach. Instruct patient to call for assistance. Room bathroom lighting operational. Non-slip footwear when patient is off stretcher. Physically safe environment: no spills, clutter or unnecessary equipment. Stretcher in lowest position, wheels locked, appropriate side rails in place.

## 2021-03-09 ENCOUNTER — TRANSCRIPTION ENCOUNTER (OUTPATIENT)
Age: 76
End: 2021-03-09

## 2021-03-09 LAB
APTT BLD: 88.9 SEC — HIGH (ref 27.5–35.5)
CHOLEST SERPL-MCNC: 160 MG/DL — SIGNIFICANT CHANGE UP
HCT VFR BLD CALC: 45.7 % — SIGNIFICANT CHANGE UP (ref 39–50)
HDLC SERPL-MCNC: 48 MG/DL — SIGNIFICANT CHANGE UP
HGB BLD-MCNC: 15.2 G/DL — SIGNIFICANT CHANGE UP (ref 13–17)
LIPID PNL WITH DIRECT LDL SERPL: 100 MG/DL — HIGH
MCHC RBC-ENTMCNC: 30.7 PG — SIGNIFICANT CHANGE UP (ref 27–34)
MCHC RBC-ENTMCNC: 33.3 GM/DL — SIGNIFICANT CHANGE UP (ref 32–36)
MCV RBC AUTO: 92.3 FL — SIGNIFICANT CHANGE UP (ref 80–100)
NON HDL CHOLESTEROL: 112 MG/DL — SIGNIFICANT CHANGE UP
NRBC # BLD: 0 /100 WBCS — SIGNIFICANT CHANGE UP (ref 0–0)
PLATELET # BLD AUTO: 164 K/UL — SIGNIFICANT CHANGE UP (ref 150–400)
RBC # BLD: 4.95 M/UL — SIGNIFICANT CHANGE UP (ref 4.2–5.8)
RBC # FLD: 13.6 % — SIGNIFICANT CHANGE UP (ref 10.3–14.5)
SARS-COV-2 IGG SERPL QL IA: NEGATIVE — SIGNIFICANT CHANGE UP
SARS-COV-2 IGM SERPL IA-ACNC: 0.07 INDEX — SIGNIFICANT CHANGE UP
SARS-COV-2 RNA SPEC QL NAA+PROBE: SIGNIFICANT CHANGE UP
TRIGL SERPL-MCNC: 59 MG/DL — SIGNIFICANT CHANGE UP
WBC # BLD: 8.04 K/UL — SIGNIFICANT CHANGE UP (ref 3.8–10.5)
WBC # FLD AUTO: 8.04 K/UL — SIGNIFICANT CHANGE UP (ref 3.8–10.5)

## 2021-03-09 PROCEDURE — 92928 PRQ TCAT PLMT NTRAC ST 1 LES: CPT | Mod: LD

## 2021-03-09 PROCEDURE — 99152 MOD SED SAME PHYS/QHP 5/>YRS: CPT

## 2021-03-09 PROCEDURE — 93458 L HRT ARTERY/VENTRICLE ANGIO: CPT | Mod: 26,59

## 2021-03-09 PROCEDURE — 93010 ELECTROCARDIOGRAM REPORT: CPT

## 2021-03-09 PROCEDURE — 92921: CPT | Mod: LD

## 2021-03-09 RX ORDER — CLOPIDOGREL BISULFATE 75 MG/1
1 TABLET, FILM COATED ORAL
Qty: 90 | Refills: 3
Start: 2021-03-09 | End: 2022-03-03

## 2021-03-09 RX ADMIN — SIMVASTATIN 40 MILLIGRAM(S): 20 TABLET, FILM COATED ORAL at 21:37

## 2021-03-09 RX ADMIN — Medication 25 MILLIGRAM(S): at 05:21

## 2021-03-09 RX ADMIN — VALSARTAN 160 MILLIGRAM(S): 80 TABLET ORAL at 05:38

## 2021-03-09 RX ADMIN — HEPARIN SODIUM 650 UNIT(S)/HR: 5000 INJECTION INTRAVENOUS; SUBCUTANEOUS at 06:56

## 2021-03-09 RX ADMIN — BUDESONIDE AND FORMOTEROL FUMARATE DIHYDRATE 2 PUFF(S): 160; 4.5 AEROSOL RESPIRATORY (INHALATION) at 05:37

## 2021-03-09 RX ADMIN — CLOPIDOGREL BISULFATE 75 MILLIGRAM(S): 75 TABLET, FILM COATED ORAL at 05:22

## 2021-03-09 RX ADMIN — AMLODIPINE BESYLATE 5 MILLIGRAM(S): 2.5 TABLET ORAL at 05:21

## 2021-03-09 RX ADMIN — HEPARIN SODIUM 0 UNIT(S)/HR: 5000 INJECTION INTRAVENOUS; SUBCUTANEOUS at 06:25

## 2021-03-09 RX ADMIN — Medication 81 MILLIGRAM(S): at 05:22

## 2021-03-09 NOTE — DISCHARGE NOTE PROVIDER - NSDCFUADDINST_GEN_ALL_CORE_FT
Wound Care:   the day AFTER your procedure remove bandage GENTLY, and clean using  mild soap and gentle warm, water stream, pat dry. leave OPEN to air. YOU MAY SHOWER   DO NOT apply lotions, creams, ointments, powder, perfumes to your incision site  DO NOT SOAK your site for 1 week ( no baths, no pools, no tubs, etc...)  Check  your wrist daily. A small amount of bruising, and soreness are normal    ACTIVITY: for 24 hours   - DO NOT DRIVE  - DO NOT make any important decisions or sign legal documents   - DO NOT operate heavy machineries  - you may resume sexual activity in 48 hours, unless otherwise instructed by your cardiologist     If your procedure was done through the WRIST: for the NEXT 3DAYS:  - avoid pushing, pulling, with that affected wrist   - avoid repeated movement of that hand and wrist (eg: typing, hammering)  - DO NOT LIFT anything more than 5 lbs     If your procedure was done through the GROIN: for the NEXT 5 DAYS  - Limit climbing stairs, DO NOT soak in bathtub or pool  - no strenuous activities, pushing, pulling, straining  - Do not lift anything 10lbs or heavier     MEDICATION:   take your medications as explained (see discharge paperwork)   If you received a STENT, you will be taking antiplatelet medications to KEEP YOUR STENT OPEN ( eg: Aspirin, Plavix, Brilinta, Effient, etc).  Take as prescribed DO NOT STOP taking them without consulting with your cardiologist first.     Follow heart healthy diet recommended by your doctor, , if you smoke STOP SMOKING ( may call [phone] for center of tobacco control if you need assistance)     CALL your doctor to make appointment in 2 WEEKS     ***CALL YOUR DOCTOR***  if you experience: fever, chills, body aches, or severe pain, swelling, redness, heat or yellow discharge at incision site  If you experience Bleeding or excruciating pain at the procedural site, swelling ( golf ball size) at your procedural site  If you experience CHEST PAIN  If you experience extremity numbness, tingling, temperature change ( of your procedural site)   If you are unable to reach your doctor, you may contact:   -Cardiology Office at [facility] at [phone] or   - [facility] [phone]  - [facility] [phone]

## 2021-03-09 NOTE — DISCHARGE NOTE PROVIDER - NSDCMRMEDTOKEN_GEN_ALL_CORE_FT
amlodipine-valsartan 5 mg-160 mg oral tablet: 1 tab(s) orally once a day  aspirin 81 mg oral tablet, chewable: 1 tab(s) orally once a day  budesonide-formoterol 160 mcg-4.5 mcg/inh inhalation aerosol: 2 puff(s) inhaled 2 times a day  ezetimibe 10 mg oral tablet: 1 tab(s) orally once a day  metoprolol tartrate 25 mg oral tablet: 1 tab(s) orally 2 times a day  simvastatin 20 mg oral tablet: 1 tab(s) orally once a day (at bedtime)   amlodipine-valsartan 5 mg-160 mg oral tablet: 1 tab(s) orally once a day  aspirin 81 mg oral tablet, chewable: 1 tab(s) orally once a day  budesonide-formoterol 160 mcg-4.5 mcg/inh inhalation aerosol: 2 puff(s) inhaled 2 times a day  clopidogrel 75 mg oral tablet: 1 tab(s) orally once a day  ezetimibe 10 mg oral tablet: 1 tab(s) orally once a day  metoprolol tartrate 25 mg oral tablet: 1 tab(s) orally 2 times a day  simvastatin 20 mg oral tablet: 1 tab(s) orally once a day (at bedtime)

## 2021-03-09 NOTE — DISCHARGE NOTE PROVIDER - CARE PROVIDER_API CALL
Mela Fuentes  CARDIOVASCULAR DISEASE  [address], [address]  [place] [address]  Phone: [phone]  Fax: [phone]  Established Patient  Follow Up Time: 2 weeks

## 2021-03-09 NOTE — DISCHARGE NOTE PROVIDER - NSDCCPCAREPLAN_GEN_ALL_CORE_FT
PRINCIPAL DISCHARGE DIAGNOSIS  Diagnosis: Chest pain, unspecified type  Assessment and Plan of Treatment:       SECONDARY DISCHARGE DIAGNOSES  Diagnosis: CAD (coronary artery disease)  Assessment and Plan of Treatment: Low salt, low fat diet.   Weight management.   Take medications as prescribed.   No smoking.  Follow up appointments with your doctor(s)  as instruced.  Continue your medications. Do not stop Aspirin or Plavix unless instructed by your cardiologist.  No heavy lifting or pushing/pulling or strenuous activity with procedure arm for 2 weeks. No driving for 2 days. No sex for 1 week.  You may shower 24 hours following procedure but no soaking of your wrist in water (such as in a pool, sink, or tub) for 1 week. Check wrist site for bleeding and/or swelling daily following procedure. Call your doctor/cardiologist immediately for bleeding or swelling or if you have increased/persistent pain or drainage at the wrist site or if you have numbness, tingling or blue or white coloring of your hand or fingers.  Follow up with your cardiologist in 1- 2 weeks. You may call [facility] at [phone] or [phone] after office hours and weekends with any questions or concerns following your procedure.

## 2021-03-09 NOTE — DISCHARGE NOTE PROVIDER - NSDCCPTREATMENT_GEN_ALL_CORE_FT
PRINCIPAL PROCEDURE  Procedure: Left heart cardiac cath  Findings and Treatment: INDICATIONS: Unstable angina - CCS4.  HISTORY: There was no prior cardiac history. The patient has hypertension.  PROCEDURE:  --  Left heart catheterization.  --  Left coronary angiography.  --  Right coronary angiography.  --  Intervention on mid LAD: drug-eluting stent.  --  Intervention on D1: balloon angioplasty.  CORONARY VESSELS: The coronary circulation is right dominant.  LM:   --  LM: Normal.  LAD:   --  Mid LAD: There was a 99 % stenosis.  --  D1: There was a 99 % stenosis.  CX:   --  Circumflex: Normal.  RCA:   --  Mid RCA: There was a 40 % stenosis.  COMPLICATIONS: There were no complications.  INTERVENTIONAL RECOMMENDATIONS: DAPT for 6 mths

## 2021-03-09 NOTE — DISCHARGE NOTE PROVIDER - NSRESEARCHGRANT_MLMHIDDEN_GEN_A_CORE
Telephone Encounter by Mihaela Monreal RN at 10/02/17 12:09 PM     Author:  Mihaela Monreal RN Service:  (none) Author Type:  Registered Nurse     Filed:  10/02/17 12:12 PM Encounter Date:  10/2/2017 Status:  Signed     :  Mihaela Monreal RN (Registered Nurse)            Patient notified[HB1.1T] of labs from [facility] via [facility].[HB1.1M]       Revision History        User Key Date/Time User Provider Type Action    > HB1.1 10/02/17 12:12 PM Mihaela Monreal RN Registered Nurse Sign    M - Manual, T - Template            
yes

## 2021-03-09 NOTE — DISCHARGE NOTE PROVIDER - HOSPITAL COURSE
74 yo male with history of hypertension and hypercholesterol presents after being referred by pmd for unstable angina. He has had chest pain with no radiation for the past 3 weeks. He has had some shortness of breath and palpitations with activity.  pt with sig cradiac risk factor with c/o increasing sob .pulmonary htn, with new onst of exertional sob and gamboa with ecg changes.  3/8- s/p mLAD JOSHUA x1, POBA to D1 via right radial artery, no hematoma noted, positive pulses. Patient without complaint, hemodynamically stable. Pending discharge   74 yo male with history of hypertension and hypercholesterol presents after being referred by pmd for unstable angina. He has had chest pain with no radiation for the past 3 weeks. He has had some shortness of breath and palpitations with activity.  pt with sig cradiac risk factor with c/o increasing sob .pulmonary htn, with new onst of exertional sob and gamboa with ecg changes.  3/9- s/p mLAD JOSHUA x1, POBA to D1 via RRA access.  Patient tolerated the procedure well.  Post PCI EKG without acute changes.  RRA site benign, pulses palpable, no hematoma noted, patient without complaints.    3/10 No acute overnight events, patient remains hemodynamically stable and his hospital course was otherwise uneventful.  Patient is now medically stable for discharge home today.

## 2021-03-09 NOTE — DISCHARGE NOTE PROVIDER - NSDCFUSCHEDAPPT_GEN_ALL_CORE_FT
KELI ALLEN ; 04/06/2021 ; NPP Ophthal [address] [facility]  KELI ALLEN ; 04/06/2021 ; [facility]

## 2021-03-10 ENCOUNTER — TRANSCRIPTION ENCOUNTER (OUTPATIENT)
Age: 76
End: 2021-03-10

## 2021-03-10 VITALS
TEMPERATURE: 98 F | HEART RATE: 67 BPM | SYSTOLIC BLOOD PRESSURE: 98 MMHG | RESPIRATION RATE: 16 BRPM | DIASTOLIC BLOOD PRESSURE: 64 MMHG | OXYGEN SATURATION: 97 %

## 2021-03-10 RX ADMIN — AMLODIPINE BESYLATE 5 MILLIGRAM(S): 2.5 TABLET ORAL at 05:35

## 2021-03-10 RX ADMIN — BUDESONIDE AND FORMOTEROL FUMARATE DIHYDRATE 2 PUFF(S): 160; 4.5 AEROSOL RESPIRATORY (INHALATION) at 05:35

## 2021-03-10 RX ADMIN — Medication 81 MILLIGRAM(S): at 05:35

## 2021-03-10 RX ADMIN — CLOPIDOGREL BISULFATE 75 MILLIGRAM(S): 75 TABLET, FILM COATED ORAL at 05:35

## 2021-03-10 RX ADMIN — VALSARTAN 160 MILLIGRAM(S): 80 TABLET ORAL at 05:35

## 2021-03-10 NOTE — DISCHARGE NOTE NURSING/CASE MANAGEMENT/SOCIAL WORK - PATIENT PORTAL LINK FT
You can access the FollowMyHealth Patient Portal offered by [facility] by registering at the following website: [email or web address]. By joining [facility]’s FollowMyHealth portal, you will also be able to view your health information using other applications (apps) compatible with our system.

## 2021-03-10 NOTE — PROGRESS NOTE ADULT - SUBJECTIVE AND OBJECTIVE BOX
CARDIOLOGY     PROGRESS  NOTE   ________________________________________________    CHIEF COMPLAINT:Patient is a 75y old  Male who presents with a chief complaint of chest mi/sob (08 Mar 2021 17:36)  chest pain last night.  	  REVIEW OF SYSTEMS:  CONSTITUTIONAL: No fever, weight loss, or fatigue  EYES: No eye pain, visual disturbances, or discharge  ENT:  No difficulty hearing, tinnitus, vertigo; No sinus or throat pain  NECK: No pain or stiffness  RESPIRATORY: No cough, wheezing, chills or hemoptysis; No Shortness of Breath  CARDIOVASCULAR: No chest pain, palpitations, passing out, dizziness, or leg swelling  GASTROINTESTINAL: No abdominal or epigastric pain. No nausea, vomiting, or hematemesis; No diarrhea or constipation. No melena or hematochezia.  GENITOURINARY: No dysuria, frequency, hematuria, or incontinence  NEUROLOGICAL: No headaches, memory loss, loss of strength, numbness, or tremors  SKIN: No itching, burning, rashes, or lesions   LYMPH Nodes: No enlarged glands  ENDOCRINE: No heat or cold intolerance; No hair loss  MUSCULOSKELETAL: No joint pain or swelling; No muscle, back, or extremity pain  PSYCHIATRIC: No depression, anxiety, mood swings, or difficulty sleeping  HEME/LYMPH: No easy bruising, or bleeding gums  ALLERGY AND IMMUNOLOGIC: No hives or eczema	    [ ] All others negative	  [ ] Unable to obtain    PHYSICAL EXAM:  T(C): 36.6 (03-09-21 @ 05:40), Max: 36.7 (03-08-21 @ 15:43)  HR: 86 (03-09-21 @ 05:40) (55 - 86)  BP: 119/67 (03-09-21 @ 05:40) (96/56 - 167/84)  RR: 16 (03-09-21 @ 05:40) (16 - 18)  SpO2: 99% (03-09-21 @ 05:40) (95% - 100%)  Wt(kg): --  I&O's Summary    09 Mar 2021 07:01  -  09 Mar 2021 09:09  --------------------------------------------------------  IN: 240 mL / OUT: 0 mL / NET: 240 mL        Appearance: Normal	  HEENT:   Normal oral mucosa, PERRL, EOMI	  Lymphatic: No lymphadenopathy  Cardiovascular: Normal S1 S2, No JVD, + murmurs, No edema  Respiratory: Lungs clear to auscultation	  Psychiatry: A & O x 3, Mood & affect appropriate  Gastrointestinal:  Soft, Non-tender, + BS	  Skin: No rashes, No ecchymoses, No cyanosis	  Neurologic: Non-focal  Extremities: Normal range of motion, No clubbing, cyanosis or edema  Vascular: Peripheral pulses palpable 2+ bilaterally    MEDICATIONS  (STANDING):  amLODIPine   Tablet 5 milliGRAM(s) Oral daily  aspirin enteric coated 81 milliGRAM(s) Oral daily  budesonide 160 MICROgram(s)/formoterol 4.5 MICROgram(s) Inhaler 2 Puff(s) Inhalation two times a day  clopidogrel Tablet 75 milliGRAM(s) Oral daily  heparin  Infusion. 800 Unit(s)/Hr (8 mL/Hr) IV Continuous <Continuous>  influenza   Vaccine 0.5 milliLiter(s) IntraMuscular once  metoprolol tartrate 25 milliGRAM(s) Oral two times a day  simvastatin 40 milliGRAM(s) Oral at bedtime  valsartan 160 milliGRAM(s) Oral daily      TELEMETRY: 	    ECG:  	  RADIOLOGY:  OTHER: 	  	  LABS:	 	    CARDIAC MARKERS:  CARDIAC MARKERS ( 08 Mar 2021 21:19 )  x     / x     / 196 U/L / x     / 11.1 ng/mL                                15.2   8.04  )-----------( 164      ( 09 Mar 2021 05:52 )             45.7     03-08    140  |  102  |  19  ----------------------------<  87  5.0   |  26  |  1.05    Ca    10.7<H>      08 Mar 2021 17:05    TPro  7.5  /  Alb  4.5  /  TBili  0.5  /  DBili  x   /  AST  38  /  ALT  22  /  AlkPhos  53  03-08    proBNP:   Lipid Profile:   HgA1c:   TSH:   PT/INR - ( 08 Mar 2021 17:05 )   PT: 11.4 sec;   INR: 0.95 ratio         PTT - ( 09 Mar 2021 05:52 )  PTT:88.9 sec  < from: Cardiac Cath Lab (03.11.11 @ 10:29) >  LM:      LM: Normal.  LAD:      Mid LAD: There was a 50 % stenosis just after the bifurcation of  a large D1.  CX:      Circumflex: Normal.  RCA:      RCA: Angiography showed minor luminal irregularities with no flow  limiting lesions.    < end of copied text >      Assessment and plan  ---------------------------  76 yo male with history of hypertension and hypercholesterol presents after being referred by pmd for unstable angina. He has had chest pain with no radiation for the past 3 weeks. He has had some shortness of breath and palpitations with activity. No swelling and has been taking his medications. No fevers, cough, belly pain or swelling noted.  pt with sig cardiac risk factor with c/o increasing sob .pulmonary htn, with new onset of exertional sob and gamboa with ecg changes.  tele  asa  plavix  beta blocker  lipid, continue zocor  tsh  continue all bp meds  cath today    	        
           CARDIOLOGY     PROGRESS  NOTE   ________________________________________________    CHIEF COMPLAINT:Patient is a 75y old  Male who presents with a chief complaint of chest pain/sob (09 Mar 2021 22:34)  doing much better.  	  REVIEW OF SYSTEMS:  CONSTITUTIONAL: No fever, weight loss, or fatigue  EYES: No eye pain, visual disturbances, or discharge  ENT:  No difficulty hearing, tinnitus, vertigo; No sinus or throat pain  NECK: No pain or stiffness  RESPIRATORY: No cough, wheezing, chills or hemoptysis; No Shortness of Breath  CARDIOVASCULAR: No chest pain, palpitations, passing out, dizziness, or leg swelling  GASTROINTESTINAL: No abdominal or epigastric pain. No nausea, vomiting, or hematemesis; No diarrhea or constipation. No melena or hematochezia.  GENITOURINARY: No dysuria, frequency, hematuria, or incontinence  NEUROLOGICAL: No headaches, memory loss, loss of strength, numbness, or tremors  SKIN: No itching, burning, rashes, or lesions   LYMPH Nodes: No enlarged glands  ENDOCRINE: No heat or cold intolerance; No hair loss  MUSCULOSKELETAL: No joint pain or swelling; No muscle, back, or extremity pain  PSYCHIATRIC: No depression, anxiety, mood swings, or difficulty sleeping  HEME/LYMPH: No easy bruising, or bleeding gums  ALLERGY AND IMMUNOLOGIC: No hives or eczema	    [ ] All others negative	  [ ] Unable to obtain    PHYSICAL EXAM:  T(C): 36.6 (03-10-21 @ 05:20), Max: 36.6 (03-09-21 @ 21:05)  HR: 57 (03-10-21 @ 05:20) (53 - 65)  BP: 118/72 (03-10-21 @ 05:20) (91/51 - 124/74)  RR: 16 (03-10-21 @ 05:20) (14 - 17)  SpO2: 96% (03-10-21 @ 05:20) (95% - 98%)  Wt(kg): --  I&O's Summary    09 Mar 2021 07:01  -  10 Mar 2021 07:00  --------------------------------------------------------  IN: 480 mL / OUT: 500 mL / NET: -20 mL        Appearance: Normal	  HEENT:   Normal oral mucosa, PERRL, EOMI	  Lymphatic: No lymphadenopathy  Cardiovascular: Normal S1 S2, No JVD, + murmurs, No edema  Respiratory: Lungs clear to auscultation	  Psychiatry: A & O x 3, Mood & affect appropriate  Gastrointestinal:  Soft, Non-tender, + BS	  Skin: No rashes, No ecchymoses, No cyanosis	  Neurologic: Non-focal  Extremities: Normal range of motion, No clubbing, cyanosis or edema  Vascular: Peripheral pulses palpable 2+ bilaterally    MEDICATIONS  (STANDING):  amLODIPine   Tablet 5 milliGRAM(s) Oral daily  aspirin enteric coated 81 milliGRAM(s) Oral daily  budesonide 160 MICROgram(s)/formoterol 4.5 MICROgram(s) Inhaler 2 Puff(s) Inhalation two times a day  clopidogrel Tablet 75 milliGRAM(s) Oral daily  influenza   Vaccine 0.5 milliLiter(s) IntraMuscular once  metoprolol tartrate 25 milliGRAM(s) Oral two times a day  simvastatin 40 milliGRAM(s) Oral at bedtime  valsartan 160 milliGRAM(s) Oral daily      TELEMETRY: 	    ECG:  	  RADIOLOGY:  OTHER: 	  	  LABS:	 	    CARDIAC MARKERS:  CARDIAC MARKERS ( 08 Mar 2021 21:19 )  x     / x     / 196 U/L / x     / 11.1 ng/mL                                15.2   8.04  )-----------( 164      ( 09 Mar 2021 05:52 )             45.7     03-08    140  |  102  |  19  ----------------------------<  87  5.0   |  26  |  1.05    Ca    10.7<H>      08 Mar 2021 17:05    TPro  7.5  /  Alb  4.5  /  TBili  0.5  /  DBili  x   /  AST  38  /  ALT  22  /  AlkPhos  53  03-08    proBNP:   Lipid Profile: Cholesterol 160  LDL --  HDL 48  TG 59    HgA1c:   TSH:   PT/INR - ( 08 Mar 2021 17:05 )   PT: 11.4 sec;   INR: 0.95 ratio         PTT - ( 09 Mar 2021 05:52 )  PTT:88.9 sec  < from: Cardiac Cath Lab - Adult (03.09.21 @ 08:32) >  CORONARY VESSELS: The coronary circulation is right dominant.  LM:   --  LM: Normal.  LAD:   --  Mid LAD: There was a 99 % stenosis.  --  D1: There was a 99 % stenosis.  CX:   --  Circumflex: Normal.  RCA:   --  Mid RCA: There was a 40 % stenosis.  COMPLICATIONS: There were no complications.  INTERVENTIONAL RECOMMENDATIONS: DAPT for 6 mths        Assessment and plan  ---------------------------  74 yo male with history of hypertension and hypercholesterol presents after being referred by pmd for unstable angina. He has had chest pain with no radiation for the past 3 weeks. He has had some shortness of breath and palpitations with activity. No swelling and has been taking his medications. No fevers, cough, belly pain or swelling noted.  pt with sig cardiac risk factor with c/o increasing sob .pulmonary htn, with new onset of exertional sob and gamboa with ecg changes.  tele  asa  plavix  beta blocker  lipid, continue zocor  tsh  continue all bp meds  cath s/p LAD stent and DX ptca  continue asa/plavix discussed with lashay jackson today fu with me in 2 weeks

## 2021-03-16 PROCEDURE — C1894: CPT

## 2021-03-16 PROCEDURE — 84484 ASSAY OF TROPONIN QUANT: CPT

## 2021-03-16 PROCEDURE — U0003: CPT

## 2021-03-16 PROCEDURE — 80061 LIPID PANEL: CPT

## 2021-03-16 PROCEDURE — 99153 MOD SED SAME PHYS/QHP EA: CPT

## 2021-03-16 PROCEDURE — 85025 COMPLETE CBC W/AUTO DIFF WBC: CPT

## 2021-03-16 PROCEDURE — 85027 COMPLETE CBC AUTOMATED: CPT

## 2021-03-16 PROCEDURE — C1769: CPT

## 2021-03-16 PROCEDURE — 93005 ELECTROCARDIOGRAM TRACING: CPT

## 2021-03-16 PROCEDURE — 82550 ASSAY OF CK (CPK): CPT

## 2021-03-16 PROCEDURE — C1887: CPT

## 2021-03-16 PROCEDURE — 85610 PROTHROMBIN TIME: CPT

## 2021-03-16 PROCEDURE — 86850 RBC ANTIBODY SCREEN: CPT

## 2021-03-16 PROCEDURE — C9600: CPT | Mod: LD

## 2021-03-16 PROCEDURE — 99152 MOD SED SAME PHYS/QHP 5/>YRS: CPT

## 2021-03-16 PROCEDURE — 71046 X-RAY EXAM CHEST 2 VIEWS: CPT

## 2021-03-16 PROCEDURE — 86900 BLOOD TYPING SEROLOGIC ABO: CPT

## 2021-03-16 PROCEDURE — 94640 AIRWAY INHALATION TREATMENT: CPT

## 2021-03-16 PROCEDURE — 82553 CREATINE MB FRACTION: CPT

## 2021-03-16 PROCEDURE — C1874: CPT

## 2021-03-16 PROCEDURE — 85730 THROMBOPLASTIN TIME PARTIAL: CPT

## 2021-03-16 PROCEDURE — 86769 SARS-COV-2 COVID-19 ANTIBODY: CPT

## 2021-03-16 PROCEDURE — 92921: CPT | Mod: LD

## 2021-03-16 PROCEDURE — 99285 EMERGENCY DEPT VISIT HI MDM: CPT

## 2021-03-16 PROCEDURE — 80053 COMPREHEN METABOLIC PANEL: CPT

## 2021-03-16 PROCEDURE — U0005: CPT

## 2021-03-16 PROCEDURE — C1725: CPT

## 2021-03-16 PROCEDURE — 86901 BLOOD TYPING SEROLOGIC RH(D): CPT

## 2021-03-16 PROCEDURE — 93458 L HRT ARTERY/VENTRICLE ANGIO: CPT | Mod: 59

## 2021-04-06 ENCOUNTER — APPOINTMENT (OUTPATIENT)
Dept: OPHTHALMOLOGY | Facility: CLINIC | Age: 76
End: 2021-04-06

## 2021-10-07 ENCOUNTER — NON-APPOINTMENT (OUTPATIENT)
Age: 76
End: 2021-10-07

## 2021-10-07 ENCOUNTER — APPOINTMENT (OUTPATIENT)
Dept: OPHTHALMOLOGY | Facility: CLINIC | Age: 76
End: 2021-10-07
Payer: MEDICARE

## 2021-10-07 PROCEDURE — 92083 EXTENDED VISUAL FIELD XM: CPT

## 2021-10-07 PROCEDURE — 92014 COMPRE OPH EXAM EST PT 1/>: CPT

## 2022-09-13 ENCOUNTER — APPOINTMENT (OUTPATIENT)
Dept: RADIOLOGY | Facility: CLINIC | Age: 77
End: 2022-09-13

## 2022-09-13 PROCEDURE — 77080 DXA BONE DENSITY AXIAL: CPT

## 2022-12-22 ENCOUNTER — APPOINTMENT (OUTPATIENT)
Dept: OPHTHALMOLOGY | Facility: CLINIC | Age: 77
End: 2022-12-22

## 2022-12-22 ENCOUNTER — NON-APPOINTMENT (OUTPATIENT)
Age: 77
End: 2022-12-22

## 2022-12-22 PROCEDURE — 92083 EXTENDED VISUAL FIELD XM: CPT

## 2022-12-22 PROCEDURE — 92014 COMPRE OPH EXAM EST PT 1/>: CPT

## 2023-07-13 NOTE — PATIENT PROFILE ADULT - NSPROEXTENSIONSOFSELF_GEN_A_NUR
Price (Use Numbers Only, No Special Characters Or $): 217 Size Of Lesion In Cm: 0.6 X Size Of Lesion In Cm (Optional): 0 Excision Method: 6 mm Punch Excision Depth: adipose tissue Repair Type: None (Simple) Suture Removal: 14 days Lab: [id number] Lab Facility: [id number] Detail Level: Detailed Anesthesia Type: 1% lidocaine with epinephrine Additional Anesthesia Volume In Cc: 6 Hemostasis: Electrocautery Estimated Blood Loss (Cc): minimal Deep Sutures: 5-0 Vicryl Epidermal Sutures: 4-0 Ethilon Epidermal Closure: simple interrupted Wound Care: Petrolatum Dressing: dry sterile dressing Complex Repair Preamble Text (Leave Blank If You Do Not Want): Extensive wide undermining was performed. Intermediate Repair Preamble Text (Leave Blank If You Do Not Want): Undermining was performed with blunt dissection. 4 Mm Punch Excision Text: A 4 mm punch was used to make an initial incision over the lesion.  After this overlying column of skin was removed, blunt dissection was used to free the lesion from the surrounding tissues and the lesion was extirpated through the surgical opening made by the punch biopsy. 4.5 Mm Punch Excision Text: A 4.5 mm punch was used to make an initial incision over the lesion.  After this overlying column of skin was removed, blunt dissection was used to free the lesion from the surrounding tissues and the lesion was extirpated through the surgical opening made by the punch biopsy. 5 Mm Punch Excision Text: A 5 mm punch was used to make an initial incision over the lesion.  After this overlying column of skin was removed, blunt dissection was used to free the lesion from the surrounding tissues and the lesion was extirpated through the surgical opening made by the punch biopsy. 6 Mm Punch Excision Text: A 6 mm punch was used to make an initial incision over the lesion.  After this overlying column of skin was removed, blunt dissection was used to free the lesion from the surrounding tissues and the lesion was extirpated through the surgical opening made by the punch biopsy. 7 Mm Punch Excision Text: A 7 mm punch was used to make an initial incision over the lesion.  After this overlying column of skin was removed, blunt dissection was used to free the lesion from the surrounding tissues and the lesion was extirpated through the surgical opening made by the punch biopsy. 8 Mm Punch Excision Text: A 8 mm punch was used to make an initial incision over the lesion.  After this overlying column of skin was removed, blunt dissection was used to free the lesion from the surrounding tissues and the lesion was extirpated through the surgical opening made by the punch biopsy. 10 Mm Punch Excision Text: A 10 mm punch was used to make an initial incision over the lesion.  After this overlying column of skin was removed, blunt dissection was used to free the lesion from the surrounding tissues and the lesion was extirpated through the surgical opening made by the punch biopsy. 12 Mm Punch Excision Text: A 12 mm punch was used to make an initial incision over the lesion.  After this overlying column of skin was removed, blunt dissection was used to free the lesion from the surrounding tissues and the lesion was extirpated through the surgical opening made by the punch biopsy. Purse String (Intermediate) Text: Given the location of the defect and the characteristics of the surrounding skin a purse string intermediate closure was deemed most appropriate.  Undermining was performed circumferentially around the surgical defect.  A purse string suture was then placed and tightened. Path Notes (To The Dermatopathologist): Please check margins. Render Path Notes In Note?: No Consent was obtained from the patient. The risks and benefits to therapy were discussed in detail. Specifically, the risks of infection, scarring, bleeding, prolonged wound healing, incomplete removal, allergy to anesthesia, nerve injury and recurrence were addressed. Prior to the procedure, the treatment site was clearly identified and confirmed by the patient. All components of Universal Protocol/PAUSE Rule completed. Render Post-Care Instructions In Note?: yes none Post-Care Instructions: I reviewed with the patient in detail post-care instructions. Patient is not to engage in any heavy lifting, exercise, or swimming for the next 14 days. Should the patient develop any fevers, chills, bleeding, severe pain patient will contact the office immediately. Billing Type: Third-Party Bill

## 2024-05-03 ENCOUNTER — APPOINTMENT (OUTPATIENT)
Dept: RADIOLOGY | Facility: CLINIC | Age: 79
End: 2024-05-03

## 2024-05-07 ENCOUNTER — NON-APPOINTMENT (OUTPATIENT)
Age: 79
End: 2024-05-07

## 2024-05-07 ENCOUNTER — APPOINTMENT (OUTPATIENT)
Dept: OPHTHALMOLOGY | Facility: CLINIC | Age: 79
End: 2024-05-07
Payer: MEDICARE

## 2024-05-07 PROCEDURE — 92083 EXTENDED VISUAL FIELD XM: CPT

## 2024-05-07 PROCEDURE — 92014 COMPRE OPH EXAM EST PT 1/>: CPT

## 2024-05-13 ENCOUNTER — APPOINTMENT (OUTPATIENT)
Dept: RADIOLOGY | Facility: CLINIC | Age: 79
End: 2024-05-13
Payer: MEDICARE

## 2024-05-13 PROCEDURE — 77080 DXA BONE DENSITY AXIAL: CPT

## 2024-08-14 NOTE — ED ADULT TRIAGE NOTE - ISOLATION TYPE:
Head, normocephalic, atraumatic, Face, Face within normal limits, External ears within normal limits, External nose  normal appearance None

## 2024-12-09 NOTE — ED ADULT NURSE NOTE - NSFALLRSKASSESSTYPE_ED_ALL_ED
Patient is a 69y old  Female who presents with a chief complaint of Sent in by podiatrist for right foot/toe wound, worsening since October. (09 Dec 2024 09:56)       INTERVAL HPI/OVERNIGHT EVENTS:  Patient seen and evaluated at bedside.  Pt is resting comfortable in NAD. Denies N/V/F/C.    Allergies    No Known Allergies    Intolerances        Vital Signs Last 24 Hrs  T(C): 37.2 (09 Dec 2024 06:05), Max: 37.2 (08 Dec 2024 22:00)  T(F): 99 (09 Dec 2024 06:05), Max: 99 (08 Dec 2024 22:00)  HR: 73 (09 Dec 2024 06:05) (73 - 89)  BP: 139/61 (09 Dec 2024 06:05) (125/57 - 146/84)  BP(mean): --  RR: 17 (09 Dec 2024 06:05) (17 - 18)  SpO2: 99% (09 Dec 2024 06:05) (97% - 100%)    Parameters below as of 09 Dec 2024 06:05  Patient On (Oxygen Delivery Method): room air        LABS:                        9.5    11.60 )-----------( 515      ( 09 Dec 2024 07:10 )             29.3     12-09    134[L]  |  100  |  16  ----------------------------<  159[H]  4.2   |  24  |  1.42[H]    Ca    9.2      09 Dec 2024 07:10  Phos  3.0     12-09  Mg     2.30     12-09      PT/INR - ( 09 Dec 2024 07:10 )   PT: 12.4 sec;   INR: 1.07 ratio         PTT - ( 09 Dec 2024 07:10 )  PTT:36.1 sec  Urinalysis Basic - ( 09 Dec 2024 07:10 )    Color: x / Appearance: x / SG: x / pH: x  Gluc: 159 mg/dL / Ketone: x  / Bili: x / Urobili: x   Blood: x / Protein: x / Nitrite: x   Leuk Esterase: x / RBC: x / WBC x   Sq Epi: x / Non Sq Epi: x / Bacteria: x      CAPILLARY BLOOD GLUCOSE      POCT Blood Glucose.: 282 mg/dL (09 Dec 2024 09:49)  POCT Blood Glucose.: 174 mg/dL (09 Dec 2024 08:17)  POCT Blood Glucose.: 200 mg/dL (08 Dec 2024 22:10)  POCT Blood Glucose.: 177 mg/dL (08 Dec 2024 17:15)  POCT Blood Glucose.: 146 mg/dL (08 Dec 2024 12:15)      Lower Extremity Physical Exam:  Vascular: DP/PT 0/4, B/L, CFT <3 seconds B/L, Temperature gradient warm to cool, B/L.   Neuro: Epicritic sensation absent to the level of digits, B/L.  Musculoskeletal/Ortho: unremarkable  Skin: 12/4 s/p b/s  incision and drainage on left foot hallux wound was performed to the level of and not beyond bone,  mild malodor, scant purulence drainage. Right foot no open wounds, no acute signs of infection.    RADIOLOGY & ADDITIONAL TESTS:   Initial (On Arrival)

## 2025-02-27 ENCOUNTER — APPOINTMENT (OUTPATIENT)
Dept: OPHTHALMOLOGY | Facility: CLINIC | Age: 80
End: 2025-02-27
Payer: MEDICARE

## 2025-02-27 ENCOUNTER — NON-APPOINTMENT (OUTPATIENT)
Age: 80
End: 2025-02-27

## 2025-02-27 PROCEDURE — 92012 INTRM OPH EXAM EST PATIENT: CPT

## 2025-02-27 PROCEDURE — 92083 EXTENDED VISUAL FIELD XM: CPT

## 2025-04-01 ENCOUNTER — APPOINTMENT (OUTPATIENT)
Dept: OPHTHALMOLOGY | Facility: CLINIC | Age: 80
End: 2025-04-01
Payer: MEDICARE

## 2025-04-01 ENCOUNTER — NON-APPOINTMENT (OUTPATIENT)
Age: 80
End: 2025-04-01

## 2025-04-01 PROCEDURE — 92133 CPTRZD OPH DX IMG PST SGM ON: CPT

## 2025-04-01 PROCEDURE — 99214 OFFICE O/P EST MOD 30 MIN: CPT

## 2025-04-01 PROCEDURE — 92083 EXTENDED VISUAL FIELD XM: CPT

## 2025-07-29 ENCOUNTER — APPOINTMENT (OUTPATIENT)
Dept: OPHTHALMOLOGY | Facility: CLINIC | Age: 80
End: 2025-07-29

## 2025-09-09 ENCOUNTER — NON-APPOINTMENT (OUTPATIENT)
Age: 80
End: 2025-09-09

## 2025-09-15 ENCOUNTER — NON-APPOINTMENT (OUTPATIENT)
Age: 80
End: 2025-09-15